# Patient Record
Sex: FEMALE | Race: WHITE | NOT HISPANIC OR LATINO | Employment: FULL TIME | URBAN - METROPOLITAN AREA
[De-identification: names, ages, dates, MRNs, and addresses within clinical notes are randomized per-mention and may not be internally consistent; named-entity substitution may affect disease eponyms.]

---

## 2018-06-12 ENCOUNTER — OFFICE VISIT (OUTPATIENT)
Dept: MEDICAL GROUP | Age: 33
End: 2018-06-12
Payer: COMMERCIAL

## 2018-06-12 VITALS
BODY MASS INDEX: 21.57 KG/M2 | OXYGEN SATURATION: 99 % | WEIGHT: 134.2 LBS | TEMPERATURE: 99.3 F | HEART RATE: 78 BPM | HEIGHT: 66 IN | SYSTOLIC BLOOD PRESSURE: 102 MMHG | DIASTOLIC BLOOD PRESSURE: 64 MMHG

## 2018-06-12 DIAGNOSIS — J30.1 NON-SEASONAL ALLERGIC RHINITIS DUE TO POLLEN: ICD-10-CM

## 2018-06-12 DIAGNOSIS — R76.11 POSITIVE PPD: ICD-10-CM

## 2018-06-12 PROCEDURE — 99213 OFFICE O/P EST LOW 20 MIN: CPT | Performed by: PHYSICIAN ASSISTANT

## 2018-06-12 RX ORDER — DEXTROAMPHETAMINE SACCHARATE, AMPHETAMINE ASPARTATE, DEXTROAMPHETAMINE SULFATE AND AMPHETAMINE SULFATE 2.5; 2.5; 2.5; 2.5 MG/1; MG/1; MG/1; MG/1
5 TABLET ORAL 2 TIMES DAILY
COMMUNITY
End: 2020-01-29

## 2018-06-12 ASSESSMENT — PATIENT HEALTH QUESTIONNAIRE - PHQ9: CLINICAL INTERPRETATION OF PHQ2 SCORE: 0

## 2018-06-13 NOTE — PROGRESS NOTES
CC: Nonseasonal allergies    History of Present Illness: This is a 32 y.o. female new patient who presents today to establish care and discuss the chronic conditions outlined below. This patient previously saw Dr. Ailyn Dick for primary care. Other specialists include ENT, allergy. Records for all have been requested. This patient has a past medical history significant for chronic allergies with current exacerbation.    Non-seasonal allergic rhinitis due to pollen  This is a chronic problem.  The patient reports that she has struggled with allergies for many years, and is interested in trying alternate therapies.  She tells me that she takes over-the-counter antihistamines daily, currently using loratadine but has also tried cetirizine.  She is using her Patt pot twice a day, and has been using Flonase intermittently.  However, she continues to complain of low energy, foggy brain, body aches, low-grade fevers, headaches, and frontal sinus congestion.  She has had a prior sinus surgery, and tells me that this helped for 1-2 months after the procedure, but began to have congestion and recurrence of her symptoms shortly thereafter.  She does not have any specific triggers, and seems to have symptoms yearly.  She has seen an ENT in the past, as well as an allergist.  She has become frustrated with her symptoms and is wondering if there is anything she has not tried.  She asks about cromolyn sodium nasal spray, as well as montelukast.  We discussed the benefits of these.  However, due to her complaints, I do think one more trial of her over-the-counter remedies is warranted.  I encouraged her to stop taking the loratadine and use Lexi pot twice daily with instillation of Flonase immediately after.  She will also use a humidifier at night, as well as antihistamine eyedrops.  If she fails these, we will consider alternate therapies.  She denies any asthmatic component of her allergies.      Patient Active Problem List     Diagnosis Date Noted   • Non-seasonal allergic rhinitis due to pollen 2016   • Screening for tuberculosis 2016   • Need for hepatitis B screening test 2016   • Positive PPD 2012          Additional History:     No Known Allergies    Current medicines (including changes today)  Current Outpatient Prescriptions   Medication Sig Dispense Refill   • amphetamine-dextroamphetamine (ADDERALL, 10MG,) 10 MG Tab Take 5 mg by mouth 2 times a day.     • Multiple Vitamins-Minerals (MENS MULTI VITAMIN & MINERAL PO) Take  by mouth.     • vitamin D (CHOLECALCIFEROL) 1000 UNIT Tab Take 1,000 Units by mouth every day.       No current facility-administered medications for this visit.      She  has a past medical history of Allergy and TB lung, latent.  She  has a past surgical history that includes bunionectomy (Bilateral, ).  Social History   Substance Use Topics   • Smoking status: Never Smoker   • Smokeless tobacco: Never Used   • Alcohol use Yes      Comment: rarely       Family History   Problem Relation Age of Onset   • Cancer Maternal Uncle      lung cancer in a smoker   • No Known Problems Mother    • No Known Problems Father    • Diabetes Paternal Grandfather      T2DM   • Heart Attack Paternal Grandmother      Family Status   Relation Status   • Maternal Uncle    • Mother Alive   • Father Alive   • Sister Alive   • Brother Alive   • Maternal Aunt Alive   • Brother Alive   • Brother Alive   • Brother Alive   • Sister Alive   • Sister Alive   • Sister Alive   • Sister Alive   • Sister Alive   • Paternal Grandfather Alive   • Paternal Grandmother Alive   • Maternal Grandmother    • Maternal Grandfather        Patient Active Problem List    Diagnosis Date Noted   • Non-seasonal allergic rhinitis due to pollen 2016   • Screening for tuberculosis 2016   • Need for hepatitis B screening test 2016   • Positive PPD 2012         Review of Systems:  "  Constitutional: Positive for fatigue, malaise.  Negative for fever, chills, unexpected weight change, and generalized weakness.   Eyes: Positive for itchy, watery eyes.  Negative for blurred or double vision, eye pain, eye discharge.  ENT: Positive for headaches, frontal sinus congestion, rhinorrhea, sore throat, voice change, plugging in her ears.  Negative for headaches, hearing changes, ear pain, ear discharge, rhinorrhea, sinus congestion, sore throat, and neck pain.   Respiratory: Negative for cough, sputum production, chest congestion, dyspnea, wheezing, and crackles.   Cardiovascular: Negative for chest pain, palpitations, orthopnea, and bilateral lower extremity edema.   Gastrointestinal: Negative for heartburn, nausea, vomiting, abdominal pain, hematochezia, melena, diarrhea, constipation, and greasy/foul-smelling stools.   Genitourinary: Negative for dysuria, polyuria, hematuria, pyuria, urgency, frequency and incontinence.  Musculoskeletal: Negative for myalgias, back pain, and joint pain.   Skin: Negative for rash, itching, cyanotic skin color change.   Neurological: Negative for dizziness, tingling, tremors, focal sensory deficit, focal weakness and headaches.   Heme: Does not bruise/bleed easily.    Endocrine: Negative for heat or cold intolerance, polydipsia, polyuria.  Psychiatric/Behavioral: Negative for depression, suicidal or homicidal ideation and memory loss.         Physical Exam:   Vitals: Blood pressure 102/64, pulse 78, temperature 37.4 °C (99.3 °F), height 1.676 m (5' 6\"), weight 60.9 kg (134 lb 3.2 oz), last menstrual period 05/19/2018, SpO2 99 %, not currently breastfeeding.  BMI: Body mass index is 21.66 kg/m².  General/Constitutional: Vitals as above, well nourished, well developed female in no acute distress  Head: Head is grossly normal & atraumatic.  Eyes: Bilateral conjunctivae clear and not injected, bilateral EOMI, bilateral PERRL  ENT: Voice is scratchy and broken. Bilateral " external ears grossly normal in appearance, EACs clear & bilateral TMs visualized but dull in appearance, hearing grossly intact. External nares normal in appearance and without discharge or bleeding. Good dentition, posterior oropharynx with erythema, but without edema/exudates.  Neck: Neck supple, no masses, neck non-tender to palpation, no thyromegaly/goiter.  Lymph: No adenopathy in anterior/posterior cervical and supra-/infrascapular nodes.   Respiratory: Normal effort, lungs are clear to auscultation in all fields (anterior, lateral, posterior), no wheezing, rhonchi or rales.  Cardiovascular: Regular rate and rhythm without murmurs, gallops or rubs, no bilateral lower extremity edema.  Musculoskeletal: Gait grossly normal & not antalgic, no tenderness to percussion of vertebral processes, no CVAT.  Skin: Warm and dry with no apparent rashes or lesions.  Neuro: Gross motor movement intact in all 4 extremities, gross sensation intact to extremities and trunk, gait grossly normal and not antalgic.  Cranial nerve examination: Pupils equally round and react to light. Extraocular muscles are intact. Visual fields intact. No facial droop. Hearing intact to conversation. Soft palate rises symmetrically bilaterally with uvula midline. Tongue midline and cranial nerve 12 intact. No abnormal facial movements. Resisted shoulder shrug 5/5 bilaterally.  Psych: Judgment grossly appropriate, no apparent depression/anxiety.      Health Maintenance: Completed - Pap done through Dr. Montilla, requested    Imaging/Labs:  N/A    Assessment/Plan:  Care has been established  We reviewed USPSTF guidelines  Records requests sent to previous care providers  Denies intimate partner violence    1. Positive PPD  Continue with yearly chest x-rays    2. Non-seasonal allergic rhinitis due to pollen  Uncontrolled.  I have asked the patient to try one more, very diligent, effort with over-the-counter symptomatic measures.  She will discontinue  her loratadine, continue with twice daily sinus rinses with Flonase installation immediately after, use a humidifier at night while she is sleeping, and use antihistamine eyedrops for any eye irritation.  She will continue this daily for the next 2 weeks, at which time she will let me know via my chart if this is helping.  We discussed a trial of Singulair, or Chromelin sodium nasal spray as an alternative if this does not improve her symptoms.    3. Well adult  Due her to her history of amenorrhea, I encouraged the patient to take 1200 mg of calcium and 1000 mg of vitamin D daily to prevent any continued bone loss.      Return if symptoms worsen or fail to improve.      Please note that this dictation was created using voice recognition software. I have made every reasonable attempt to correct obvious errors, but I expect that there are errors of grammar and possibly content that I did not discover before finalizing the note.

## 2018-06-13 NOTE — ASSESSMENT & PLAN NOTE
This is a chronic problem.  The patient reports that she has struggled with allergies for many years, and is interested in trying alternate therapies.  She tells me that she takes over-the-counter antihistamines daily, currently using loratadine but has also tried cetirizine.  She is using her Oracle pot twice a day, and has been using Flonase intermittently.  However, she continues to complain of low energy, foggy brain, body aches, low-grade fevers, headaches, and frontal sinus congestion.  She has had a prior sinus surgery, and tells me that this helped for 1-2 months after the procedure, but began to have congestion and recurrence of her symptoms shortly thereafter.  She does not have any specific triggers, and seems to have symptoms yearly.  She has seen an ENT in the past, as well as an allergist.  She has become frustrated with her symptoms and is wondering if there is anything she has not tried.  She asks about cromolyn sodium nasal spray, as well as montelukast.  We discussed the benefits of these.  However, due to her complaints, I do think one more trial of her over-the-counter remedies is warranted.  I encouraged her to stop taking the loratadine and use Lexi pot twice daily with instillation of Flonase immediately after.  She will also use a humidifier at night, as well as antihistamine eyedrops.  If she fails these, we will consider alternate therapies.  She denies any asthmatic component of her allergies.

## 2018-06-25 ENCOUNTER — EH NON-PROVIDER (OUTPATIENT)
Dept: OCCUPATIONAL MEDICINE | Facility: CLINIC | Age: 33
End: 2018-06-25

## 2018-06-25 ENCOUNTER — NON-PROVIDER VISIT (OUTPATIENT)
Dept: OCCUPATIONAL MEDICINE | Facility: CLINIC | Age: 33
End: 2018-06-25

## 2018-06-25 DIAGNOSIS — Z01.89 RESPIRATORY CLEARANCE EXAMINATION, ENCOUNTER FOR: ICD-10-CM

## 2018-06-25 DIAGNOSIS — Z29.89 NEED FOR ISOLATION: ICD-10-CM

## 2018-06-25 PROCEDURE — 94375 RESPIRATORY FLOW VOLUME LOOP: CPT | Performed by: PREVENTIVE MEDICINE

## 2018-06-25 PROCEDURE — 8916 PR CLEARANCE ONLY: Performed by: PREVENTIVE MEDICINE

## 2018-10-04 ENCOUNTER — APPOINTMENT (OUTPATIENT)
Dept: MEDICAL GROUP | Facility: MEDICAL CENTER | Age: 33
End: 2018-10-04
Payer: COMMERCIAL

## 2019-06-26 ENCOUNTER — APPOINTMENT (OUTPATIENT)
Dept: RADIOLOGY | Facility: MEDICAL CENTER | Age: 34
End: 2019-06-26
Attending: NURSE PRACTITIONER
Payer: COMMERCIAL

## 2019-07-05 ENCOUNTER — APPOINTMENT (OUTPATIENT)
Dept: MEDICAL GROUP | Age: 34
End: 2019-07-05
Payer: COMMERCIAL

## 2020-01-04 ENCOUNTER — HOSPITAL ENCOUNTER (OUTPATIENT)
Dept: LAB | Facility: MEDICAL CENTER | Age: 35
End: 2020-01-04
Attending: SPECIALIST
Payer: COMMERCIAL

## 2020-01-04 LAB
ALBUMIN SERPL BCP-MCNC: 4.5 G/DL (ref 3.2–4.9)
ALBUMIN/GLOB SERPL: 1.9 G/DL
ALP SERPL-CCNC: 49 U/L (ref 30–99)
ALT SERPL-CCNC: 12 U/L (ref 2–50)
ANION GAP SERPL CALC-SCNC: 8 MMOL/L (ref 0–11.9)
AST SERPL-CCNC: 20 U/L (ref 12–45)
BASOPHILS # BLD AUTO: 0.4 % (ref 0–1.8)
BASOPHILS # BLD: 0.03 K/UL (ref 0–0.12)
BILIRUB SERPL-MCNC: 0.5 MG/DL (ref 0.1–1.5)
BUN SERPL-MCNC: 10 MG/DL (ref 8–22)
CALCIUM SERPL-MCNC: 8.8 MG/DL (ref 8.5–10.5)
CHLORIDE SERPL-SCNC: 108 MMOL/L (ref 96–112)
CO2 SERPL-SCNC: 24 MMOL/L (ref 20–33)
CORTIS SERPL-MCNC: 10.4 UG/DL (ref 0–23)
CREAT SERPL-MCNC: 0.78 MG/DL (ref 0.5–1.4)
EOSINOPHIL # BLD AUTO: 0.33 K/UL (ref 0–0.51)
EOSINOPHIL NFR BLD: 4.8 % (ref 0–6.9)
ERYTHROCYTE [DISTWIDTH] IN BLOOD BY AUTOMATED COUNT: 47.6 FL (ref 35.9–50)
ERYTHROCYTE [SEDIMENTATION RATE] IN BLOOD BY WESTERGREN METHOD: <1 MM/HOUR (ref 0–20)
FASTING STATUS PATIENT QL REPORTED: NORMAL
GLOBULIN SER CALC-MCNC: 2.4 G/DL (ref 1.9–3.5)
GLUCOSE SERPL-MCNC: 83 MG/DL (ref 65–99)
HCT VFR BLD AUTO: 44.4 % (ref 37–47)
HGB BLD-MCNC: 14.6 G/DL (ref 12–16)
IMM GRANULOCYTES # BLD AUTO: 0.01 K/UL (ref 0–0.11)
IMM GRANULOCYTES NFR BLD AUTO: 0.1 % (ref 0–0.9)
LYMPHOCYTES # BLD AUTO: 2.11 K/UL (ref 1–4.8)
LYMPHOCYTES NFR BLD: 30.5 % (ref 22–41)
MCH RBC QN AUTO: 31.9 PG (ref 27–33)
MCHC RBC AUTO-ENTMCNC: 32.9 G/DL (ref 33.6–35)
MCV RBC AUTO: 96.9 FL (ref 81.4–97.8)
MONOCYTES # BLD AUTO: 0.41 K/UL (ref 0–0.85)
MONOCYTES NFR BLD AUTO: 5.9 % (ref 0–13.4)
NEUTROPHILS # BLD AUTO: 4.02 K/UL (ref 2–7.15)
NEUTROPHILS NFR BLD: 58.3 % (ref 44–72)
NRBC # BLD AUTO: 0 K/UL
NRBC BLD-RTO: 0 /100 WBC
PLATELET # BLD AUTO: 252 K/UL (ref 164–446)
PMV BLD AUTO: 9.9 FL (ref 9–12.9)
POTASSIUM SERPL-SCNC: 3.8 MMOL/L (ref 3.6–5.5)
PROT SERPL-MCNC: 6.9 G/DL (ref 6–8.2)
RBC # BLD AUTO: 4.58 M/UL (ref 4.2–5.4)
SODIUM SERPL-SCNC: 140 MMOL/L (ref 135–145)
T4 SERPL-MCNC: 8.3 UG/DL (ref 4–12)
WBC # BLD AUTO: 6.9 K/UL (ref 4.8–10.8)

## 2020-01-04 PROCEDURE — 85652 RBC SED RATE AUTOMATED: CPT

## 2020-01-04 PROCEDURE — 84436 ASSAY OF TOTAL THYROXINE: CPT

## 2020-01-04 PROCEDURE — 80053 COMPREHEN METABOLIC PANEL: CPT

## 2020-01-04 PROCEDURE — 86038 ANTINUCLEAR ANTIBODIES: CPT

## 2020-01-04 PROCEDURE — 82533 TOTAL CORTISOL: CPT

## 2020-01-04 PROCEDURE — 36415 COLL VENOUS BLD VENIPUNCTURE: CPT

## 2020-01-04 PROCEDURE — 85025 COMPLETE CBC W/AUTO DIFF WBC: CPT

## 2020-01-06 LAB — NUCLEAR IGG SER QL IA: NORMAL

## 2020-01-28 ENCOUNTER — TELEPHONE (OUTPATIENT)
Dept: CARDIOLOGY | Facility: MEDICAL CENTER | Age: 35
End: 2020-01-28

## 2020-01-28 NOTE — TELEPHONE ENCOUNTER
Spoke to patient in regards to records for NP appointment. Patient has never been treated by a cardiologist, all recent records in epic except a 48 holter monitor that was done @[facility]. STAT records request sent to F#:[phone] P#:[phone]. Confirmation fax sent to scan.

## 2020-01-29 ENCOUNTER — OFFICE VISIT (OUTPATIENT)
Dept: CARDIOLOGY | Facility: MEDICAL CENTER | Age: 35
End: 2020-01-29
Payer: COMMERCIAL

## 2020-01-29 VITALS
DIASTOLIC BLOOD PRESSURE: 62 MMHG | HEIGHT: 66 IN | WEIGHT: 130 LBS | HEART RATE: 94 BPM | SYSTOLIC BLOOD PRESSURE: 82 MMHG | BODY MASS INDEX: 20.89 KG/M2 | OXYGEN SATURATION: 99 % | RESPIRATION RATE: 16 BRPM

## 2020-01-29 DIAGNOSIS — R42 DIZZINESS: ICD-10-CM

## 2020-01-29 DIAGNOSIS — R55 SYNCOPE, UNSPECIFIED SYNCOPE TYPE: ICD-10-CM

## 2020-01-29 LAB — EKG IMPRESSION: NORMAL

## 2020-01-29 PROCEDURE — 99205 OFFICE O/P NEW HI 60 MIN: CPT | Performed by: INTERNAL MEDICINE

## 2020-01-29 PROCEDURE — 93000 ELECTROCARDIOGRAM COMPLETE: CPT | Performed by: INTERNAL MEDICINE

## 2020-01-29 ASSESSMENT — ENCOUNTER SYMPTOMS
SHORTNESS OF BREATH: 0
PALPITATIONS: 0
FALLS: 0
ORTHOPNEA: 0
PND: 0
DIZZINESS: 1
LOSS OF CONSCIOUSNESS: 1
ABDOMINAL PAIN: 0
DEPRESSION: 0

## 2020-01-29 NOTE — LETTER
"     [facility]   [address]  [place] [address]  Phone: [phone]  Fax: [phone]              Nargis Elizalde  1985    Encounter Date: 1/29/2020    Lucie Chun M.D.          PROGRESS NOTE:  Chief Complaint   Patient presents with   • Syncope       Subjective:   Nargis Elizalde is a 34 y.o. female referred to cardiology clinic for management of syncope.    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  She is trying to couples match with her boyfriend who is applying for emergency medicine and they are hoping to go to [employer].    Patient reports being healthy all her life till around July when she went on 100 mile run which she treated adequately for.  About a week later she donated blood which was uneventful.  About 1.5 hours after she had completed her blood donation, she was walking outside and suddenly felt \"a sinking feeling\" associated with blurry vision and anxiety.  She sat down and then lost consciousness for a few minutes.    Since then she has had about 8-10 episodes of near syncope all of which were preceded by \"a sinking feeling\" and the other aforementioned symptoms.  She denies any associated palpitations.  She does endorse that her symptoms lasted almost 4 hours and then improved.  During that time she feels \"spacey\".  She has tried to increase her fluid and salt intake which has helped her symptoms.  She also notes that squatting and lying down help improve her symptoms.    Her second episode of syncope occurred in December while she was walking at [employer].  Again had sudden onset of the aforementioned symptoms, walked over to a chair where she had syncope for a few seconds.    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  During her initial interviews in [place], she was sitting in a presentation and again had sudden onset of the aforementioned symptoms.  She can " also have symptoms while driving.  She has had tunnel vision on occasion as well.  She also notices general fatigue.  She has not been exercising much since the onset of the symptoms.  She rarely drinks alcohol.  She has not been drinking much caffeine since her symptoms started.    Patient was seen by Dr. Hanson for evaluation of headaches at which time she reported having syncope and was referred to our clinic.    She used to be on Adderall for management of ADHD which she self titrated herself off around October and wonders if that could be the cause of her symptoms.    Referring physician Marissa Hanson MD    Past Medical History:   Diagnosis Date   • Allergy    • TB lung, latent      Past Surgical History:   Procedure Laterality Date   • BUNIONECTOMY Bilateral 2012     Family History   Problem Relation Age of Onset   • Cancer Maternal Uncle         lung cancer in a smoker   • No Known Problems Mother    • No Known Problems Father    • Diabetes Paternal Grandfather         T2DM   • Heart Attack Paternal Grandmother      Social History     Socioeconomic History   • Marital status: Single     Spouse name: Not on file   • Number of children: Not on file   • Years of education: Not on file   • Highest education level: Not on file   Occupational History   • Not on file   Social Needs   • Financial resource strain: Not on file   • Food insecurity:     Worry: Not on file     Inability: Not on file   • Transportation needs:     Medical: Not on file     Non-medical: Not on file   Tobacco Use   • Smoking status: Never Smoker   • Smokeless tobacco: Never Used   Substance and Sexual Activity   • Alcohol use: Yes     Comment: rarely   • Drug use: No   • Sexual activity: Yes     Partners: Male     Birth control/protection: Condom     Comment: student   Lifestyle   • Physical activity:     Days per week: Not on file     Minutes per session: Not on file   • Stress: Not on file   Relationships   • Social connections:     Talks on  "phone: Not on file     Gets together: Not on file     Attends [employer] service: Not on file     Active member of club or organization: Not on file     Attends meetings of clubs or organizations: Not on file     Relationship status: Not on file   • Intimate partner violence:     Fear of current or ex partner: Not on file     Emotionally abused: Not on file     Physically abused: Not on file     Forced sexual activity: Not on file   Other Topics Concern   • Not on file   Social History Narrative   • Not on file     No Known Allergies  Outpatient Encounter Medications as of 1/29/2020   Medication Sig Dispense Refill   • Licorice, Glycyrrhiza glabra, (LICORICE ROOT PO) Take  by mouth.     • Multiple Vitamins-Minerals (MENS MULTI VITAMIN & MINERAL PO) Take  by mouth.     • vitamin D (CHOLECALCIFEROL) 1000 UNIT Tab Take 1,000 Units by mouth every day.     • [DISCONTINUED] amphetamine-dextroamphetamine (ADDERALL, 10MG,) 10 MG Tab Take 5 mg by mouth 2 times a day.       No facility-administered encounter medications on file as of 1/29/2020.      Review of Systems   Constitutional: Positive for malaise/fatigue.   Respiratory: Negative for shortness of breath.    Cardiovascular: Negative for chest pain, palpitations, orthopnea, leg swelling and PND.   Gastrointestinal: Negative for abdominal pain.   Musculoskeletal: Negative for falls.   Neurological: Positive for dizziness and loss of consciousness.   Psychiatric/Behavioral: Negative for depression.   All other systems reviewed and are negative.       Objective:   BP (!) 94/58 (BP Location: Left arm, Patient Position: Sitting, BP Cuff Size: Adult)   Pulse 82   Resp 16   Ht 1.676 m (5' 6\")   Wt 59 kg (130 lb)   SpO2 99%   BMI 20.98 kg/m²      Physical Exam   Constitutional: She is oriented to person, place, and time. She appears well-developed and well-nourished. No distress.   HENT:   Head: Normocephalic and atraumatic.   Eyes: Conjunctivae are normal. No scleral " icterus.   Neck: Normal range of motion. Neck supple.   Cardiovascular: Normal rate, regular rhythm and normal heart sounds. Exam reveals no gallop and no friction rub.   No murmur heard.  Pulmonary/Chest: Effort normal and breath sounds normal. No respiratory distress. She has no wheezes. She has no rales.   Abdominal: Soft. She exhibits no distension. There is no tenderness.   Musculoskeletal:         General: No edema.   Neurological: She is alert and oriented to person, place, and time.   Skin: Skin is warm and dry. She is not diaphoretic.   Psychiatric: She has a normal mood and affect. Her behavior is normal.   Nursing note and vitals reviewed.    Labs performed in January 2020 were reviewed and showed normal hemoglobin and creatinine.  Normal T4    EKG performed today was personally reviewed and per my interpretation shows sinus rhythm in the 60s.  Nonspecific T wave changes.    Assessment:     1. Syncope, unspecified syncope type  EKG    EC-ECHOCARDIOGRAM COMPLETE W/O CONT    Holter Monitor / Event Recorder   2. Dizziness  EC-ECHOCARDIOGRAM COMPLETE W/O CONT    Holter Monitor / Event Recorder     Medical Decision Making:  Today's Assessment / Status / Plan:     Patient has had fairly frequent symptoms in the past 6 months.  Some of her symptoms appear to be orthostatic in nature.  Vasovagal etiology is possible however arrhythmias are of concern as well.  Orthostatic vitals were checked today and were mildly positive.  Her blood pressure dropped 8 mmHg and her heart rate increased by 24 bpm.  She will be referred for an echocardiogram to evaluate her LV function and for valvular pathology.  She will also be referred for a Bio-Tel monitor to evaluate for any arrhythmias.  In the meantime lifestyle modification has been discussed with the patient including staying hydrated and sitting down if she has symptoms.  Increasing salt intake has been discussed as well.  She will come in for repeat orthostatics in  about 2 weeks.    She is scheduled to go to [place] for 7 weeks.  The first 4 weeks she will be doing a rotation in [facility].    Return to clinic in 6 weeks or earlier if needed.    Thank you for allowing me to participate in the care of this patient. Please do not hesitate to contact me with any questions.    Lucie Chun MD, [facility]  Cardiologist  [facility]    PLEASE NOTE: This dictation was created using voice recognition software.         Roxanne Clarke M.D.  [facility] [address]  [place] [address]  VIA Facsimile: [phone]     MOOSE Hanson M.D.  [address] [place] [address] [address]  [place] [address]  VIA Facsimile: [phone]

## 2020-01-29 NOTE — PATIENT INSTRUCTIONS
Please increase your fluid intake.  Ideally at least 70 to 80 ounces per day.  Minimize alcohol and caffeine intake.  Please increase your salt intake.  Ideally about 6 to 10 g/day. If you cannot do that with meals, consider salt tablets. You can also try buffered salt tablets.   Please remember to stand up slowly and consider exercising your legs before standing up.  If you have dizziness, please remember to sit down or squat immediately.  If you are unable to sit down for any reason, consider crossing your legs and tensing your lower body muscles.  Try using compression stockings especially when standing for long durations.  Please remember to take them off every night.  If your symptoms continue to persist or worsen, please let us know.

## 2020-01-29 NOTE — PROGRESS NOTES
"Chief Complaint   Patient presents with   • Syncope       Subjective:   Nargis Elizalde is a 34 y.o. female referred to cardiology clinic for management of syncope.    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  She is trying to couples match with her boyfriend who is applying for emergency medicine and they are hoping to go to [employer] [facility].    Patient reports being healthy all her life till around July when she went on 100 mile run which she treated adequately for.  About a week later she donated blood which was uneventful.  About 1.5 hours after she had completed her blood donation, she was walking outside and suddenly felt \"a sinking feeling\" associated with blurry vision and anxiety.  She sat down and then lost consciousness for a few minutes.    Since then she has had about 8-10 episodes of near syncope all of which were preceded by \"a sinking feeling\" and the other aforementioned symptoms.  She denies any associated palpitations.  She does endorse that her symptoms lasted almost 4 hours and then improved.  During that time she feels \"spacey\".  She has tried to increase her fluid and salt intake which has helped her symptoms.  She also notes that squatting and lying down help improve her symptoms.    Her second episode of syncope occurred in December while she was walking at [employer].  Again had sudden onset of the aforementioned symptoms, walked over to a chair where she had syncope for a few seconds.    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  During her initial interviews in [place], she was sitting in a presentation and again had sudden onset of the aforementioned symptoms.  She can also have symptoms while driving.  She has had tunnel vision on occasion as well.  She also notices general fatigue.  She has not been exercising much since the onset of the symptoms.  She rarely drinks alcohol.  She has not been drinking much caffeine since her " symptoms started.    Patient was seen by Dr. Hanson for evaluation of headaches at which time she reported having syncope and was referred to our clinic.    She used to be on Adderall for management of ADHD which she self titrated herself off around October and wonders if that could be the cause of her symptoms.    Referring physician Marissa Hanson MD    Past Medical History:   Diagnosis Date   • Allergy    • TB lung, latent      Past Surgical History:   Procedure Laterality Date   • BUNIONECTOMY Bilateral 2012     Family History   Problem Relation Age of Onset   • Cancer Maternal Uncle         lung cancer in a smoker   • No Known Problems Mother    • No Known Problems Father    • Diabetes Paternal Grandfather         T2DM   • Heart Attack Paternal Grandmother      Social History     Socioeconomic History   • Marital status: Single     Spouse name: Not on file   • Number of children: Not on file   • Years of education: Not on file   • Highest education level: Not on file   Occupational History   • Not on file   Social Needs   • Financial resource strain: Not on file   • Food insecurity:     Worry: Not on file     Inability: Not on file   • Transportation needs:     Medical: Not on file     Non-medical: Not on file   Tobacco Use   • Smoking status: Never Smoker   • Smokeless tobacco: Never Used   Substance and Sexual Activity   • Alcohol use: Yes     Comment: rarely   • Drug use: No   • Sexual activity: Yes     Partners: Male     Birth control/protection: Condom     Comment: student   Lifestyle   • Physical activity:     Days per week: Not on file     Minutes per session: Not on file   • Stress: Not on file   Relationships   • Social connections:     Talks on phone: Not on file     Gets together: Not on file     Attends [employer] service: Not on file     Active member of club or organization: Not on file     Attends meetings of clubs or organizations: Not on file     Relationship status: Not on file   • Intimate  "partner violence:     Fear of current or ex partner: Not on file     Emotionally abused: Not on file     Physically abused: Not on file     Forced sexual activity: Not on file   Other Topics Concern   • Not on file   Social History Narrative   • Not on file     No Known Allergies  Outpatient Encounter Medications as of 1/29/2020   Medication Sig Dispense Refill   • Licorice, Glycyrrhiza glabra, (LICORICE ROOT PO) Take  by mouth.     • Multiple Vitamins-Minerals (MENS MULTI VITAMIN & MINERAL PO) Take  by mouth.     • vitamin D (CHOLECALCIFEROL) 1000 UNIT Tab Take 1,000 Units by mouth every day.     • [DISCONTINUED] amphetamine-dextroamphetamine (ADDERALL, 10MG,) 10 MG Tab Take 5 mg by mouth 2 times a day.       No facility-administered encounter medications on file as of 1/29/2020.      Review of Systems   Constitutional: Positive for malaise/fatigue.   Respiratory: Negative for shortness of breath.    Cardiovascular: Negative for chest pain, palpitations, orthopnea, leg swelling and PND.   Gastrointestinal: Negative for abdominal pain.   Musculoskeletal: Negative for falls.   Neurological: Positive for dizziness and loss of consciousness.   Psychiatric/Behavioral: Negative for depression.   All other systems reviewed and are negative.       Objective:   BP (!) 94/58 (BP Location: Left arm, Patient Position: Sitting, BP Cuff Size: Adult)   Pulse 82   Resp 16   Ht 1.676 m (5' 6\")   Wt 59 kg (130 lb)   SpO2 99%   BMI 20.98 kg/m²     Physical Exam   Constitutional: She is oriented to person, place, and time. She appears well-developed and well-nourished. No distress.   HENT:   Head: Normocephalic and atraumatic.   Eyes: Conjunctivae are normal. No scleral icterus.   Neck: Normal range of motion. Neck supple.   Cardiovascular: Normal rate, regular rhythm and normal heart sounds. Exam reveals no gallop and no friction rub.   No murmur heard.  Pulmonary/Chest: Effort normal and breath sounds normal. No respiratory " distress. She has no wheezes. She has no rales.   Abdominal: Soft. She exhibits no distension. There is no tenderness.   Musculoskeletal:         General: No edema.   Neurological: She is alert and oriented to person, place, and time.   Skin: Skin is warm and dry. She is not diaphoretic.   Psychiatric: She has a normal mood and affect. Her behavior is normal.   Nursing note and vitals reviewed.    Labs performed in January 2020 were reviewed and showed normal hemoglobin and creatinine.  Normal T4    EKG performed today was personally reviewed and per my interpretation shows sinus rhythm in the 60s.  Nonspecific T wave changes.    Assessment:     1. Syncope, unspecified syncope type  EKG    EC-ECHOCARDIOGRAM COMPLETE W/O CONT    Holter Monitor / Event Recorder   2. Dizziness  EC-ECHOCARDIOGRAM COMPLETE W/O CONT    Holter Monitor / Event Recorder     Medical Decision Making:  Today's Assessment / Status / Plan:     Patient has had fairly frequent symptoms in the past 6 months.  Some of her symptoms appear to be orthostatic in nature.  Vasovagal etiology is possible however arrhythmias are of concern as well.  Orthostatic vitals were checked today and were mildly positive.  Her blood pressure dropped 8 mmHg and her heart rate increased by 24 bpm.  She will be referred for an echocardiogram to evaluate her LV function and for valvular pathology.  She will also be referred for a Bio-Tel monitor to evaluate for any arrhythmias.  In the meantime lifestyle modification has been discussed with the patient including staying hydrated and sitting down if she has symptoms.  Increasing salt intake has been discussed as well.  She will come in for repeat orthostatics in about 2 weeks.    She is scheduled to go to [place] for 7 weeks.  The first 4 weeks she will be doing a rotation in [facility].    Return to clinic in 6 weeks or earlier if needed.    Thank you for allowing me to participate in the care of this patient. Please  do not hesitate to contact me with any questions.    Lucie Chun MD, [facility]  Cardiologist  [facility]    PLEASE NOTE: This dictation was created using voice recognition software.

## 2020-02-14 ENCOUNTER — HOSPITAL ENCOUNTER (OUTPATIENT)
Dept: CARDIOLOGY | Facility: MEDICAL CENTER | Age: 35
End: 2020-02-14
Attending: INTERNAL MEDICINE
Payer: COMMERCIAL

## 2020-02-14 ENCOUNTER — TELEPHONE (OUTPATIENT)
Dept: CARDIOLOGY | Facility: MEDICAL CENTER | Age: 35
End: 2020-02-14

## 2020-02-14 ENCOUNTER — NON-PROVIDER VISIT (OUTPATIENT)
Dept: CARDIOLOGY | Facility: MEDICAL CENTER | Age: 35
End: 2020-02-14
Payer: COMMERCIAL

## 2020-02-14 DIAGNOSIS — R42 DIZZINESS: ICD-10-CM

## 2020-02-14 DIAGNOSIS — R55 SYNCOPE, UNSPECIFIED SYNCOPE TYPE: ICD-10-CM

## 2020-02-14 LAB
LV EJECT FRACT  99904: 65
LV EJECT FRACT MOD 2C 99903: 64.52
LV EJECT FRACT MOD 4C 99902: 68.15
LV EJECT FRACT MOD BP 99901: 66.87

## 2020-02-14 PROCEDURE — 93268 ECG RECORD/REVIEW: CPT | Performed by: INTERNAL MEDICINE

## 2020-02-14 PROCEDURE — 93306 TTE W/DOPPLER COMPLETE: CPT | Mod: 26 | Performed by: INTERNAL MEDICINE

## 2020-02-14 PROCEDURE — 93306 TTE W/DOPPLER COMPLETE: CPT

## 2020-02-14 NOTE — TELEPHONE ENCOUNTER
Patient enrolled in the 30 day Bio-Tel [facility] Heart monitoring program per Dr. Chun.  >In office hookup with [facility] transmitted  >Pending EOS.

## 2020-03-11 ENCOUNTER — HOSPITAL ENCOUNTER (OUTPATIENT)
Dept: LAB | Facility: MEDICAL CENTER | Age: 35
End: 2020-03-11
Attending: OBSTETRICS & GYNECOLOGY
Payer: COMMERCIAL

## 2020-03-11 ENCOUNTER — HOSPITAL ENCOUNTER (OUTPATIENT)
Dept: LAB | Facility: MEDICAL CENTER | Age: 35
End: 2020-03-11
Attending: INTERNAL MEDICINE
Payer: COMMERCIAL

## 2020-03-11 LAB
HCV AB SER QL: NORMAL
HIV 1+2 AB+HIV1 P24 AG SERPL QL IA: NORMAL
TREPONEMA PALLIDUM IGG+IGM AB [PRESENCE] IN SERUM OR PLASMA BY IMMUNOASSAY: NORMAL

## 2020-03-11 PROCEDURE — 82784 ASSAY IGA/IGD/IGG/IGM EACH: CPT

## 2020-03-11 PROCEDURE — 86803 HEPATITIS C AB TEST: CPT

## 2020-03-11 PROCEDURE — 86003 ALLG SPEC IGE CRUDE XTRC EA: CPT | Mod: 91

## 2020-03-11 PROCEDURE — 87389 HIV-1 AG W/HIV-1&-2 AB AG IA: CPT

## 2020-03-11 PROCEDURE — 86694 HERPES SIMPLEX NES ANTBDY: CPT

## 2020-03-11 PROCEDURE — 86780 TREPONEMA PALLIDUM: CPT

## 2020-03-11 PROCEDURE — 82785 ASSAY OF IGE: CPT

## 2020-03-11 PROCEDURE — 86317 IMMUNOASSAY INFECTIOUS AGENT: CPT | Mod: 91

## 2020-03-11 PROCEDURE — 36415 COLL VENOUS BLD VENIPUNCTURE: CPT

## 2020-03-11 PROCEDURE — 83520 IMMUNOASSAY QUANT NOS NONAB: CPT

## 2020-03-13 LAB
C DIPHTHERIAE IGG SER-ACNC: 1.1 IU/ML
HSV1+2 IGG SER IA-ACNC: 0.11 IV
IGA SERPL-MCNC: 85 MG/DL (ref 68–408)
IGG SERPL-MCNC: 701 MG/DL (ref 768–1632)
IGM SERPL-MCNC: 44 MG/DL (ref 35–263)
TRYPTASE SERPL-MCNC: 6.1 UG/L

## 2020-03-14 LAB
A ALTERNATA IGE QN: <0.1 KU/L
A FUMIGATUS IGE QN: <0.1 KU/L
BERMUDA GRASS IGE QN: <0.1 KU/L
BOXELDER IGE QN: <0.1 KU/L
C SPHAEROSPERMUM IGE QN: <0.1 KU/L
CAT DANDER IGE QN: <0.1 KU/L
CMN PIGWEED IGE QN: <0.1 KU/L
COMMON RAGWEED IGE QN: <0.1 KU/L
COTTONWOOD IGE QN: <0.1 KU/L
D FARINAE IGE QN: <0.1 KU/L
D PTERONYSS IGE QN: <0.1 KU/L
DEPRECATED MISC ALLERGEN IGE RAST QL: NORMAL
DOG DANDER IGE QN: <0.1 KU/L
IGE SERPL-ACNC: 4 KU/L
M RACEMOSUS IGE QN: <0.1 KU/L
MOUSE EPITH IGE QN: <0.1 KU/L
MT JUNIPER IGE QN: <0.1 KU/L
MUGWORT IGE QN: <0.1 KU/L
OLIVE POLN IGE QN: <0.1 KU/L
P NOTATUM IGE QN: <0.1 KU/L
ROACH IGE QN: <0.1 KU/L
S PN DA SERO 19F IGG SER-MCNC: 0.33 UG/ML
S PNEUM DA 1 IGG SER-MCNC: 0.12 UG/ML
S PNEUM DA 12F IGG SER-MCNC: 0.04 UG/ML
S PNEUM DA 14 IGG SER-MCNC: 0.12 UG/ML
S PNEUM DA 18C IGG SER-MCNC: 1.58 UG/ML
S PNEUM DA 23F IGG SER-MCNC: 0.06 UG/ML
S PNEUM DA 3 IGG SER-MCNC: 0.07 UG/ML
S PNEUM DA 4 IGG SER-MCNC: 0.06 UG/ML
S PNEUM DA 5 IGG SER-MCNC: 1.84 UG/ML
S PNEUM DA 6B IGG SER-MCNC: 0.12 UG/ML
S PNEUM DA 7F IGG SER-MCNC: 2.6 UG/ML
S PNEUM DA 8 IGG SER-MCNC: 1.47 UG/ML
S PNEUM DA 9N IGG SER-MCNC: 0.2 UG/ML
S PNEUM DA 9V IGG SER-MCNC: 0.13 UG/ML
S PNEUM SEROTYPE IGG SER-IMP: NORMAL
SALTWORT IGE QN: <0.1 KU/L
TIMOTHY IGE QN: <0.1 KU/L
WHITE ELM IGE QN: <0.1 KU/L
WHITE MULBERRY IGE QN: <0.1 KU/L
WHITE OAK IGE QN: <0.1 KU/L

## 2020-03-16 DIAGNOSIS — R42 DIZZINESS: ICD-10-CM

## 2020-03-16 DIAGNOSIS — R55 SYNCOPE, UNSPECIFIED SYNCOPE TYPE: ICD-10-CM

## 2020-04-03 ENCOUNTER — HOSPITAL ENCOUNTER (OUTPATIENT)
Facility: MEDICAL CENTER | Age: 35
End: 2020-04-03
Attending: PHYSICIAN ASSISTANT
Payer: COMMERCIAL

## 2020-04-03 PROCEDURE — 82533 TOTAL CORTISOL: CPT

## 2020-04-09 ENCOUNTER — HOSPITAL ENCOUNTER (OUTPATIENT)
Dept: LAB | Facility: MEDICAL CENTER | Age: 35
End: 2020-04-09
Attending: PHYSICIAN ASSISTANT
Payer: COMMERCIAL

## 2020-04-09 LAB
25(OH)D3 SERPL-MCNC: 55 NG/ML (ref 30–100)
ALBUMIN SERPL BCP-MCNC: 4.9 G/DL (ref 3.2–4.9)
ALBUMIN/GLOB SERPL: 2.2 G/DL
ALP SERPL-CCNC: 56 U/L (ref 30–99)
ALT SERPL-CCNC: 13 U/L (ref 2–50)
ANION GAP SERPL CALC-SCNC: 14 MMOL/L (ref 7–16)
AST SERPL-CCNC: 18 U/L (ref 12–45)
BILIRUB SERPL-MCNC: 0.7 MG/DL (ref 0.1–1.5)
BUN SERPL-MCNC: 12 MG/DL (ref 8–22)
CALCIUM SERPL-MCNC: 9.1 MG/DL (ref 8.5–10.5)
CHLORIDE SERPL-SCNC: 101 MMOL/L (ref 96–112)
CO2 SERPL-SCNC: 23 MMOL/L (ref 20–33)
CORTIS SERPL-MCNC: 11.8 UG/DL (ref 0–23)
CREAT SERPL-MCNC: 0.65 MG/DL (ref 0.5–1.4)
ESTRADIOL SERPL-MCNC: 119 PG/ML
FSH SERPL-ACNC: 7.1 MIU/ML
GLOBULIN SER CALC-MCNC: 2.2 G/DL (ref 1.9–3.5)
GLUCOSE SERPL-MCNC: 79 MG/DL (ref 65–99)
LH SERPL-ACNC: 7.5 IU/L
POTASSIUM SERPL-SCNC: 3.7 MMOL/L (ref 3.6–5.5)
PROLACTIN SERPL-MCNC: 15 NG/ML (ref 2.8–26)
PROT SERPL-MCNC: 7.1 G/DL (ref 6–8.2)
SODIUM SERPL-SCNC: 138 MMOL/L (ref 135–145)
T3 SERPL-MCNC: 93.6 NG/DL (ref 60–181)
T3FREE SERPL-MCNC: 3.05 PG/ML (ref 2.4–4.2)
T4 FREE SERPL-MCNC: 1.86 NG/DL (ref 0.53–1.43)
T4 SERPL-MCNC: 9 UG/DL (ref 4–12)
THYROPEROXIDASE AB SERPL-ACNC: <9 IU/ML (ref 0–9)
TSH SERPL DL<=0.005 MIU/L-ACNC: 0.72 UIU/ML (ref 0.38–5.33)
VIT B12 SERPL-MCNC: 666 PG/ML (ref 211–911)

## 2020-04-09 PROCEDURE — 82533 TOTAL CORTISOL: CPT

## 2020-04-09 PROCEDURE — 80053 COMPREHEN METABOLIC PANEL: CPT

## 2020-04-09 PROCEDURE — 86376 MICROSOMAL ANTIBODY EACH: CPT

## 2020-04-09 PROCEDURE — 84305 ASSAY OF SOMATOMEDIN: CPT

## 2020-04-09 PROCEDURE — 84443 ASSAY THYROID STIM HORMONE: CPT

## 2020-04-09 PROCEDURE — 82607 VITAMIN B-12: CPT

## 2020-04-09 PROCEDURE — 82306 VITAMIN D 25 HYDROXY: CPT

## 2020-04-09 PROCEDURE — 84146 ASSAY OF PROLACTIN: CPT

## 2020-04-09 PROCEDURE — 84439 ASSAY OF FREE THYROXINE: CPT

## 2020-04-09 PROCEDURE — 84480 ASSAY TRIIODOTHYRONINE (T3): CPT

## 2020-04-09 PROCEDURE — 82670 ASSAY OF TOTAL ESTRADIOL: CPT

## 2020-04-09 PROCEDURE — 83002 ASSAY OF GONADOTROPIN (LH): CPT

## 2020-04-09 PROCEDURE — 83001 ASSAY OF GONADOTROPIN (FSH): CPT

## 2020-04-09 PROCEDURE — 36415 COLL VENOUS BLD VENIPUNCTURE: CPT

## 2020-04-09 PROCEDURE — 84481 FREE ASSAY (FT-3): CPT

## 2020-04-11 LAB
IGF-I SERPL-MCNC: 189 NG/ML (ref 81–278)
IGF-I Z-SCORE SERPL: 0.5

## 2020-04-13 LAB — CORTIS SAL-MCNC: 0.23 UG/DL

## 2020-12-22 DIAGNOSIS — Z23 NEED FOR VACCINATION: ICD-10-CM
